# Patient Record
Sex: MALE | Race: ASIAN | NOT HISPANIC OR LATINO
[De-identification: names, ages, dates, MRNs, and addresses within clinical notes are randomized per-mention and may not be internally consistent; named-entity substitution may affect disease eponyms.]

---

## 2018-12-05 ENCOUNTER — APPOINTMENT (OUTPATIENT)
Dept: FAMILY MEDICINE | Facility: CLINIC | Age: 78
End: 2018-12-05
Payer: COMMERCIAL

## 2018-12-05 VITALS
TEMPERATURE: 97.9 F | WEIGHT: 125 LBS | HEART RATE: 77 BPM | HEIGHT: 64 IN | DIASTOLIC BLOOD PRESSURE: 80 MMHG | SYSTOLIC BLOOD PRESSURE: 128 MMHG | BODY MASS INDEX: 21.34 KG/M2

## 2018-12-05 DIAGNOSIS — Z87.19 PERSONAL HISTORY OF OTHER DISEASES OF THE DIGESTIVE SYSTEM: ICD-10-CM

## 2018-12-05 DIAGNOSIS — Z78.9 OTHER SPECIFIED HEALTH STATUS: ICD-10-CM

## 2018-12-05 PROCEDURE — G0009: CPT

## 2018-12-05 PROCEDURE — 36415 COLL VENOUS BLD VENIPUNCTURE: CPT

## 2018-12-05 PROCEDURE — 90732 PPSV23 VACC 2 YRS+ SUBQ/IM: CPT

## 2018-12-05 PROCEDURE — 99397 PER PM REEVAL EST PAT 65+ YR: CPT | Mod: 25

## 2018-12-05 NOTE — PLAN
[FreeTextEntry1] : Patient advised to schedule eye exam and to check into Shingrix vaccination with his insurance. He should f/u in 2 weeks for TdaP.

## 2018-12-05 NOTE — HEALTH RISK ASSESSMENT
[Very Good] : ~his/her~  mood as very good [Intercurrent hospitalizations] : was admitted to the hospital  [No falls in past year] : Patient reported no falls in the past year [0] : 2) Feeling down, depressed, or hopeless: Not at all (0) [None] : None [With Significant Other] : lives with significant other [Employed] : employed [] :  [Feels Safe at Home] : Feels safe at home [Fully functional (bathing, dressing, toileting, transferring, walking, feeding)] : Fully functional (bathing, dressing, toileting, transferring, walking, feeding) [Fully functional (using the telephone, shopping, preparing meals, housekeeping, doing laundry, using] : Fully functional and needs no help or supervision to perform IADLs (using the telephone, shopping, preparing meals, housekeeping, doing laundry, using transportation, managing medications and managing finances) [Reports changes in dental health] : Reports changes in dental health [Discussed at today's visit] : Advance Directives Discussed at today's visit [FreeTextEntry1] : none [] : No [[id number]] : 0 [Change in mental status noted] : No change in mental status noted [Reports changes in hearing] : Reports no changes in hearing [Reports changes in vision] : Reports no changes in vision [ColonoscopyComments] : never [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

## 2018-12-05 NOTE — HISTORY OF PRESENT ILLNESS
[FreeTextEntry1] : Physical [de-identified] : 77 y/o male presents for a routine physical examination. Feeling well.

## 2018-12-06 LAB
25(OH)D3 SERPL-MCNC: 29.6 NG/ML
ALBUMIN SERPL ELPH-MCNC: 4.1 G/DL
ALP BLD-CCNC: 85 U/L
ALT SERPL-CCNC: 32 U/L
ANION GAP SERPL CALC-SCNC: 12 MMOL/L
APPEARANCE: CLEAR
AST SERPL-CCNC: 37 U/L
BACTERIA: NEGATIVE
BASOPHILS # BLD AUTO: 0.04 K/UL
BASOPHILS NFR BLD AUTO: 0.6 %
BILIRUB SERPL-MCNC: 0.4 MG/DL
BILIRUBIN URINE: NEGATIVE
BLOOD URINE: NEGATIVE
BUN SERPL-MCNC: 11 MG/DL
CALCIUM SERPL-MCNC: 9.2 MG/DL
CHLORIDE SERPL-SCNC: 104 MMOL/L
CHOLEST SERPL-MCNC: 245 MG/DL
CHOLEST/HDLC SERPL: 4.2 RATIO
CO2 SERPL-SCNC: 27 MMOL/L
COLOR: YELLOW
CREAT SERPL-MCNC: 0.76 MG/DL
EOSINOPHIL # BLD AUTO: 0.22 K/UL
EOSINOPHIL NFR BLD AUTO: 3.3 %
GLUCOSE QUALITATIVE U: NEGATIVE MG/DL
GLUCOSE SERPL-MCNC: 93 MG/DL
HBA1C MFR BLD HPLC: 5.8 %
HCT VFR BLD CALC: 43 %
HDLC SERPL-MCNC: 59 MG/DL
HGB BLD-MCNC: 13.9 G/DL
IMM GRANULOCYTES NFR BLD AUTO: 0.2 %
KETONES URINE: NEGATIVE
LDLC SERPL CALC-MCNC: 161 MG/DL
LEUKOCYTE ESTERASE URINE: NEGATIVE
LYMPHOCYTES # BLD AUTO: 2.25 K/UL
LYMPHOCYTES NFR BLD AUTO: 33.8 %
MAN DIFF?: NORMAL
MCHC RBC-ENTMCNC: 32.3 GM/DL
MCHC RBC-ENTMCNC: 32.8 PG
MCV RBC AUTO: 101.4 FL
MICROSCOPIC-UA: NORMAL
MONOCYTES # BLD AUTO: 0.28 K/UL
MONOCYTES NFR BLD AUTO: 4.2 %
NEUTROPHILS # BLD AUTO: 3.86 K/UL
NEUTROPHILS NFR BLD AUTO: 57.9 %
NITRITE URINE: NEGATIVE
PH URINE: 8
PLATELET # BLD AUTO: 232 K/UL
POTASSIUM SERPL-SCNC: 4.4 MMOL/L
PROT SERPL-MCNC: 6.8 G/DL
PROTEIN URINE: NEGATIVE MG/DL
RBC # BLD: 4.24 M/UL
RBC # FLD: 13 %
RED BLOOD CELLS URINE: 0 /HPF
SODIUM SERPL-SCNC: 143 MMOL/L
SPECIFIC GRAVITY URINE: 1.01
SQUAMOUS EPITHELIAL CELLS: 0 /HPF
TRIGL SERPL-MCNC: 124 MG/DL
UROBILINOGEN URINE: NEGATIVE MG/DL
WBC # FLD AUTO: 6.66 K/UL
WHITE BLOOD CELLS URINE: 0 /HPF

## 2018-12-31 ENCOUNTER — APPOINTMENT (OUTPATIENT)
Dept: FAMILY MEDICINE | Facility: CLINIC | Age: 78
End: 2018-12-31

## 2019-01-03 ENCOUNTER — RECORD ABSTRACTING (OUTPATIENT)
Age: 79
End: 2019-01-03

## 2019-01-07 ENCOUNTER — APPOINTMENT (OUTPATIENT)
Dept: FAMILY MEDICINE | Facility: CLINIC | Age: 79
End: 2019-01-07
Payer: COMMERCIAL

## 2019-01-07 ENCOUNTER — RX RENEWAL (OUTPATIENT)
Age: 79
End: 2019-01-07

## 2019-01-07 VITALS
BODY MASS INDEX: 21.34 KG/M2 | SYSTOLIC BLOOD PRESSURE: 122 MMHG | WEIGHT: 125 LBS | TEMPERATURE: 98.1 F | HEART RATE: 80 BPM | DIASTOLIC BLOOD PRESSURE: 72 MMHG | HEIGHT: 64 IN

## 2019-01-07 VITALS — SYSTOLIC BLOOD PRESSURE: 102 MMHG | DIASTOLIC BLOOD PRESSURE: 56 MMHG

## 2019-01-07 DIAGNOSIS — R60.9 EDEMA, UNSPECIFIED: ICD-10-CM

## 2019-01-07 PROCEDURE — 99213 OFFICE O/P EST LOW 20 MIN: CPT

## 2019-01-07 NOTE — PHYSICAL EXAM
[No Acute Distress] : no acute distress [Well Nourished] : well nourished [Clear to Auscultation] : lungs were clear to auscultation bilaterally [Normal Rate] : normal rate  [Regular Rhythm] : with a regular rhythm [Normal S1, S2] : normal S1 and S2 [Normal Affect] : the affect was normal [Normal Mood] : the mood was normal [de-identified] : Pitting edema B/L lower extremities to the knee

## 2019-01-07 NOTE — HISTORY OF PRESENT ILLNESS
[FreeTextEntry1] : Follow-up [de-identified] : 77 y/o male with hx of TIA. Developed significant lower leg edema from amlodipine.

## 2019-01-07 NOTE — REVIEW OF SYSTEMS
[Chest Pain] : no chest pain [Palpitations] : no palpitations [Lower Ext Edema] : lower extremity edema [Headache] : no headache [Negative] : Psychiatric

## 2019-04-03 ENCOUNTER — RX RENEWAL (OUTPATIENT)
Age: 79
End: 2019-04-03

## 2021-02-09 ENCOUNTER — RESULT REVIEW (OUTPATIENT)
Age: 81
End: 2021-02-09

## 2021-02-09 ENCOUNTER — APPOINTMENT (OUTPATIENT)
Dept: GERIATRICS | Facility: CLINIC | Age: 81
End: 2021-02-09
Payer: COMMERCIAL

## 2021-02-09 VITALS
BODY MASS INDEX: 21.63 KG/M2 | SYSTOLIC BLOOD PRESSURE: 130 MMHG | WEIGHT: 126 LBS | TEMPERATURE: 98.3 F | OXYGEN SATURATION: 98 % | DIASTOLIC BLOOD PRESSURE: 80 MMHG | HEART RATE: 80 BPM

## 2021-02-09 DIAGNOSIS — M15.9 POLYOSTEOARTHRITIS, UNSPECIFIED: ICD-10-CM

## 2021-02-09 PROCEDURE — 99215 OFFICE O/P EST HI 40 MIN: CPT

## 2021-02-09 RX ORDER — ATORVASTATIN CALCIUM 20 MG/1
20 TABLET, FILM COATED ORAL DAILY
Qty: 180 | Refills: 0 | Status: DISCONTINUED | COMMUNITY
Start: 1900-01-01 | End: 2021-02-09

## 2021-02-09 RX ORDER — AMLODIPINE BESYLATE 10 MG/1
10 TABLET ORAL DAILY
Qty: 90 | Refills: 0 | Status: DISCONTINUED | COMMUNITY
Start: 1900-01-01 | End: 2021-02-09

## 2021-02-09 RX ORDER — LISINOPRIL 5 MG/1
5 TABLET ORAL DAILY
Qty: 30 | Refills: 3 | Status: DISCONTINUED | COMMUNITY
Start: 2019-01-07 | End: 2021-02-09

## 2021-02-09 NOTE — HISTORY OF PRESENT ILLNESS
[0] : 1) Little interest or pleasure doing things: Not at all [FreeTextEntry1] : Healthy man until 2018\par diagnosed with TIA\par started on BP meds, plavix and ?statin\par one year later had first bladder infection\par sees urologist Dr. Primitivo Osman\par has had 2 infections in his life now\par usually responds to antibiotics\par \par for some reason no longer on statin\par also admits to non compliance with meds\par during losartan recall\par switched dose from 50mg daily to 25mg BID for convenience\par often skips 2-3 doses a week\par \par has some numbness on L first toe\par for several years\par was told by chiropractor about 2 years ago that he has back - disc disease\par \par eager to have physical [PHQ-2 Score ___] : PHQ-2 Score [unfilled]

## 2021-02-09 NOTE — PHYSICAL EXAM
[General Appearance - Alert] : alert [General Appearance - In No Acute Distress] : in no acute distress [General Appearance - Well Nourished] : well nourished [General Appearance - Well Developed] : well developed [Sclera] : the sclera and conjunctiva were normal [Extraocular Movements] : extraocular movements were intact [PERRL With Normal Accommodation] : pupils were equal in size, round, and reactive to light [Normal Oral Mucosa] : normal oral mucosa [No Oral Pallor] : no oral pallor [Neck Appearance] : the appearance of the neck was normal [Neck Cervical Mass (___cm)] : no neck mass was observed [Respiration, Rhythm And Depth] : normal respiratory rhythm and effort [Exaggerated Use Of Accessory Muscles For Inspiration] : no accessory muscle use [Auscultation Breath Sounds / Voice Sounds] : lungs were clear to auscultation bilaterally [Heart Rate And Rhythm] : heart rate was normal and rhythm regular [Heart Sounds] : normal S1 and S2 [Heart Sounds Gallop] : no gallops [Heart Sounds Pericardial Friction Rub] : no pericardial rub [Bowel Sounds] : normal bowel sounds [Abdomen Soft] : soft [Abdomen Tenderness] : non-tender [Cervical Lymph Nodes Enlarged Posterior Bilaterally] : posterior cervical [Cervical Lymph Nodes Enlarged Anterior Bilaterally] : anterior cervical [No CVA Tenderness] : no ~M costovertebral angle tenderness [No Spinal Tenderness] : no spinal tenderness [Abnormal Walk] : normal gait [Nail Clubbing] : no clubbing  or cyanosis of the fingernails [Musculoskeletal - Swelling] : no joint swelling seen [Motor Tone] : muscle strength and tone were normal [Skin Color & Pigmentation] : normal skin color and pigmentation [] : no rash [Skin Turgor] : normal skin turgor [Skin Lesions] : no skin lesions [Affect] : the affect was normal [Mood] : the mood was normal

## 2021-02-09 NOTE — ASSESSMENT
[FreeTextEntry1] : needs true PCP\par check labs today including A1C B12 and lipids\par unclear why is not on statin after TIA\par will likely resume lipitor 20mg as reported by prior IM physician - Dr Pizarro\par neuropathy to warrant other labs\par \par advised to take losartan 50 mg or 2 25mg tabs in AM with plavix\par hold HCTZ and also use PRN leg edema\par (edema was likely due to norvac that has since stopped)\par keep BP log BID and RTC one month for annual exam\par needs EKG and prosatte exam\par labs today will also include PSA\par \par in future to discuss colonoscopy exams in past - if not done consider cologuard\par also to discuss vaccines etc\par \par obtain records from prior PCP and urologist\par release signed today

## 2021-02-09 NOTE — SOCIAL HISTORY
[No falls in past year] : Patient reported no falls in the past year [Fully functional (bathing, dressing, toileting, transferring, walking, feeding)] : Fully functional (bathing, dressing, toileting, transferring, walking, feeding) [Fully functional (using the telephone, shopping, preparing meals, housekeeping, doing laundry, using transportation,] : Fully functional and needs no help or supervision to perform IADLs (using the telephone, shopping, preparing meals, housekeeping, doing laundry, using transportation, managing medications and managing finances) [Smoke Detector] : smoke detector [Carbon Monoxide Detector] : carbon monoxide detector [Seat Belt] :  uses seat belt

## 2021-02-09 NOTE — REVIEW OF SYSTEMS
[Fever] : no fever [Chills] : no chills [Feeling Poorly] : not feeling poorly [Feeling Tired] : not feeling tired [Eye Pain] : no eye pain [Red Eyes] : eyes not red [Nosebleeds] : no nosebleeds [Nasal Discharge] : no nasal discharge [Chest Pain] : no chest pain [Palpitations] : no palpitations [Cough] : no cough [SOB on Exertion] : no shortness of breath during exertion [Constipation] : no constipation [Diarrhea] : no diarrhea [Dysuria] : no dysuria [Incontinence] : no incontinence [Arthralgias] : arthralgias [Skin Lesions] : no skin lesions [Skin Wound] : no skin wound [Dizziness] : no dizziness [Fainting] : no fainting [Anxiety] : no anxiety [Depression] : no depression

## 2021-03-30 ENCOUNTER — APPOINTMENT (OUTPATIENT)
Dept: GERIATRICS | Facility: CLINIC | Age: 81
End: 2021-03-30
Payer: COMMERCIAL

## 2021-03-30 ENCOUNTER — NON-APPOINTMENT (OUTPATIENT)
Age: 81
End: 2021-03-30

## 2021-03-30 VITALS
WEIGHT: 126 LBS | SYSTOLIC BLOOD PRESSURE: 158 MMHG | TEMPERATURE: 98 F | DIASTOLIC BLOOD PRESSURE: 80 MMHG | OXYGEN SATURATION: 97 % | BODY MASS INDEX: 21.63 KG/M2 | HEART RATE: 76 BPM

## 2021-03-30 DIAGNOSIS — G62.9 POLYNEUROPATHY, UNSPECIFIED: ICD-10-CM

## 2021-03-30 DIAGNOSIS — Z00.00 ENCOUNTER FOR GENERAL ADULT MEDICAL EXAMINATION W/OUT ABNORMAL FINDINGS: ICD-10-CM

## 2021-03-30 PROCEDURE — G0439: CPT

## 2021-03-30 PROCEDURE — 93010 ELECTROCARDIOGRAM REPORT: CPT

## 2021-03-30 NOTE — ASSESSMENT
[FreeTextEntry1] : needs true PCP\par BP remains elevated\par increase losartan to 75mg daily in AM with plavix in AM\par compliance reemphasized\par unclear why is not on statin after TIA\par wiill optimize BP prior to discussing statin - lipids were checked and moderately elevted\par unfortauntely due to TIA will remain on statin life long\par \par will likely resume lipitor 20mg as reported by prior IM physician - Dr Pizarro\par hold HCTZ and also use PRN leg edema\par (edema was likely due to norvac that has since stopped)\par EKG prostate exam and PSa unrevealing\par will bring in bp log on in month to review and finalize dose of losartan\par possbily will increase to 100 mg daily on that day\par \par cologuard ordered as he is anemic - unclear if colon etiology or related to plavix\par \par \par in future to discuss vaccines/MOCA/MOLST\par \par

## 2021-03-30 NOTE — REVIEW OF SYSTEMS
[Fever] : no fever [Chills] : no chills [Night Sweats] : no night sweats [Recent Change In Weight] : ~T no recent weight change [Discharge] : no discharge [Vision Problems] : no vision problems [Earache] : no earache [Nasal Discharge] : no nasal discharge [Palpitations] : no palpitations [Shortness Of Breath] : no shortness of breath [Wheezing] : no wheezing [Cough] : no cough [Abdominal Pain] : no abdominal pain [Vomiting] : no vomiting [Dysuria] : no dysuria [Hematuria] : no hematuria [Joint Pain] : no joint pain [Back Pain] : no back pain [Itching] : no itching [Skin Rash] : no skin rash [Headache] : no headache [Memory Loss] : no memory loss [Easy Bruising] : easy bruising

## 2021-03-30 NOTE — PHYSICAL EXAM
[No Acute Distress] : no acute distress [Well Nourished] : well nourished [Well Developed] : well developed [Normal Sclera/Conjunctiva] : normal sclera/conjunctiva [PERRL] : pupils equal round and reactive to light [EOMI] : extraocular movements intact [Normal Outer Ear/Nose] : the outer ears and nose were normal in appearance [Normal Oropharynx] : the oropharynx was normal [Normal TMs] : both tympanic membranes were normal [No JVD] : no jugular venous distention [No Lymphadenopathy] : no lymphadenopathy [Supple] : supple [No Respiratory Distress] : no respiratory distress  [No Accessory Muscle Use] : no accessory muscle use [Clear to Auscultation] : lungs were clear to auscultation bilaterally [Normal Rate] : normal rate  [Regular Rhythm] : with a regular rhythm [Normal S1, S2] : normal S1 and S2 [Normal Sphincter Tone] : normal sphincter tone [No Mass] : no mass [Prostate Tenderness] : the prostate was not tender [No Prostate Nodules] : no prostate nodules [Normal Supraclavicular Nodes] : no supraclavicular lymphadenopathy [Normal Anterior Cervical Nodes] : no anterior cervical lymphadenopathy [No CVA Tenderness] : no CVA  tenderness [No Spinal Tenderness] : no spinal tenderness [No Joint Swelling] : no joint swelling [Grossly Normal Strength/Tone] : grossly normal strength/tone [No Rash] : no rash [No Skin Lesions] : no skin lesions [No Focal Deficits] : no focal deficits [Normal Gait] : normal gait [Deep Tendon Reflexes (DTR)] : deep tendon reflexes were 2+ and symmetric [Normal Affect] : the affect was normal [Normal Insight/Judgement] : insight and judgment were intact

## 2021-03-30 NOTE — HISTORY OF PRESENT ILLNESS
[Spouse] : spouse [FreeTextEntry1] : annual exam [de-identified] : Healthy man until 2018\par diagnosed with TIA\par started on BP meds, plavix and ?statin\par one year later had first bladder infection\par sees urologist Dr. Primitivo Osman\par has had 2 infections in his life now\par usually responds to antibiotics\par \par for some reason no longer on statin\par also admits to non compliance with meds\par during losartan recall\par switched dose from 50mg daily to 25mg BID for convenience\par often skips 2-3 doses a week\par \par since first visit has been compliant with losartan 50mg QAM and plavix 75mg QAM\par He has brought in a BP log which shows elevated readings\par \par thinks last colonoscopy was 15 years ago, thinks it was normal\par not aware of being anemic in the past, as he is now\par \par denies overt bleeding\par

## 2021-03-30 NOTE — HEALTH RISK ASSESSMENT
[No falls in past year] : Patient reported no falls in the past year [0] : 2) Feeling down, depressed, or hopeless: Not at all (0) [With Significant Other] : lives with significant other [Fully functional (bathing, dressing, toileting, transferring, walking, feeding)] : Fully functional (bathing, dressing, toileting, transferring, walking, feeding) [] :  [Fully functional (using the telephone, shopping, preparing meals, housekeeping, doing laundry, using] : Fully functional and needs no help or supervision to perform IADLs (using the telephone, shopping, preparing meals, housekeeping, doing laundry, using transportation, managing medications and managing finances) [Smoke Detector] : smoke detector [Carbon Monoxide Detector] : carbon monoxide detector [Seat Belt] :  uses seat belt [Fair] : ~his/her~ current health as fair  [Good] : ~his/her~  mood as  good [] : No [[id number]] : 0 [Patient declined colonoscopy] : Patient declined colonoscopy [Retired] : retired

## 2021-04-22 ENCOUNTER — RX RENEWAL (OUTPATIENT)
Age: 81
End: 2021-04-22

## 2021-05-12 ENCOUNTER — APPOINTMENT (OUTPATIENT)
Dept: GERIATRICS | Facility: CLINIC | Age: 81
End: 2021-05-12

## 2021-07-21 ENCOUNTER — RX RENEWAL (OUTPATIENT)
Age: 81
End: 2021-07-21

## 2021-11-12 ENCOUNTER — RX RENEWAL (OUTPATIENT)
Age: 81
End: 2021-11-12

## 2021-11-22 ENCOUNTER — TRANSCRIPTION ENCOUNTER (OUTPATIENT)
Age: 81
End: 2021-11-22

## 2022-03-30 ENCOUNTER — APPOINTMENT (OUTPATIENT)
Dept: GERIATRICS | Facility: CLINIC | Age: 82
End: 2022-03-30
Payer: MEDICARE

## 2022-03-30 ENCOUNTER — LABORATORY RESULT (OUTPATIENT)
Age: 82
End: 2022-03-30

## 2022-03-30 VITALS
HEIGHT: 64 IN | SYSTOLIC BLOOD PRESSURE: 156 MMHG | BODY MASS INDEX: 21 KG/M2 | OXYGEN SATURATION: 99 % | TEMPERATURE: 98 F | HEART RATE: 81 BPM | DIASTOLIC BLOOD PRESSURE: 80 MMHG | WEIGHT: 123 LBS

## 2022-03-30 DIAGNOSIS — N30.20 OTHER CHRONIC CYSTITIS W/OUT HEMATURIA: ICD-10-CM

## 2022-03-30 PROCEDURE — 36415 COLL VENOUS BLD VENIPUNCTURE: CPT

## 2022-03-30 PROCEDURE — 93000 ELECTROCARDIOGRAM COMPLETE: CPT

## 2022-03-30 PROCEDURE — 99214 OFFICE O/P EST MOD 30 MIN: CPT | Mod: 25

## 2022-03-30 RX ORDER — HYDROCHLOROTHIAZIDE 12.5 MG/1
12.5 TABLET ORAL DAILY
Qty: 30 | Refills: 1 | Status: COMPLETED | COMMUNITY
Start: 2020-09-01 | End: 2022-03-30

## 2022-03-30 NOTE — ASSESSMENT
[FreeTextEntry1] : needs true PCP\par BP remains elevated\par increase to losartan 100mg daily\par has been compliant with plavix which is great as opposed to when he started in my practice\par not wanting to be on statin\par but is on red yeast rice\par check labs today\par labs drawn in office \par \par in past was on lipitor 20mg as reported by prior IM physician - Dr Pizarro\par hold HCTZ and also use PRN leg edema\par \par in future to discuss vaccines/MOCA/MOLST\par \par discussed to switch all meds to 6PM x 2 weeks\par to see if fatigue will improve\par then can increase to losartan 100mg and trial until next visit which will be physical

## 2022-03-30 NOTE — REVIEW OF SYSTEMS
[Easy Bruising] : easy bruising [Fever] : no fever [Chills] : no chills [Night Sweats] : no night sweats [Recent Change In Weight] : ~T no recent weight change [Discharge] : no discharge [Vision Problems] : no vision problems [Earache] : no earache [Nasal Discharge] : no nasal discharge [Palpitations] : no palpitations [Shortness Of Breath] : no shortness of breath [Wheezing] : no wheezing [Cough] : no cough [Abdominal Pain] : no abdominal pain [Vomiting] : no vomiting [Dysuria] : no dysuria [Hematuria] : no hematuria [Joint Pain] : no joint pain [Back Pain] : no back pain [Itching] : no itching [Skin Rash] : no skin rash [Headache] : no headache [Memory Loss] : no memory loss

## 2022-03-30 NOTE — HISTORY OF PRESENT ILLNESS
[No falls in past year] : Patient reported no falls in the past year [Completely Independent] : Completely independent. [Smoke Detector] : smoke detector [0] : 2) Feeling down, depressed, or hopeless: Not at all (0) [PHQ-2 Negative - No further assessment needed] : PHQ-2 Negative - No further assessment needed [FreeTextEntry1] : Healthy man until 2018\par diagnosed with TIA\par started on BP meds, plavix and ?statin\par one year later had first bladder infection\par sees urologist Dr. Primitivo Osman\par has had 2 infections in his life now\par usually responds to antibiotics\par \par not wanting to be on statin\par now on red yeast rice \par remains on losartan at this time but log shows poor control especially in PM\par \par \par feels more tired during the day since turning 80 (he is now 81)\par  \par  \par  [[id number]] : 0

## 2022-03-31 LAB
25(OH)D3 SERPL-MCNC: 26.8 NG/ML
ALBUMIN SERPL ELPH-MCNC: 4.3 G/DL
ALP BLD-CCNC: 85 U/L
ALT SERPL-CCNC: 30 U/L
ANION GAP SERPL CALC-SCNC: 16 MMOL/L
APPEARANCE: CLEAR
AST SERPL-CCNC: 28 U/L
BASOPHILS # BLD AUTO: 0.04 K/UL
BASOPHILS NFR BLD AUTO: 0.9 %
BILIRUB SERPL-MCNC: 0.5 MG/DL
BILIRUBIN URINE: NEGATIVE
BLOOD URINE: NEGATIVE
BUN SERPL-MCNC: 13 MG/DL
CALCIUM SERPL-MCNC: 9.6 MG/DL
CHLORIDE SERPL-SCNC: 104 MMOL/L
CHOLEST SERPL-MCNC: 256 MG/DL
CO2 SERPL-SCNC: 20 MMOL/L
COLOR: YELLOW
CREAT SERPL-MCNC: 0.64 MG/DL
EGFR: 95 ML/MIN/1.73M2
EOSINOPHIL # BLD AUTO: 0 K/UL
EOSINOPHIL NFR BLD AUTO: 0 %
ESTIMATED AVERAGE GLUCOSE: 126 MG/DL
GLUCOSE QUALITATIVE U: NEGATIVE
GLUCOSE SERPL-MCNC: 113 MG/DL
HBA1C MFR BLD HPLC: 6 %
HCT VFR BLD CALC: 39.4 %
HDLC SERPL-MCNC: 67 MG/DL
HGB BLD-MCNC: 13.5 G/DL
IRON SATN MFR SERPL: 38 %
IRON SERPL-MCNC: 119 UG/DL
KETONES URINE: NEGATIVE
LDLC SERPL CALC-MCNC: 172 MG/DL
LEUKOCYTE ESTERASE URINE: NEGATIVE
LYMPHOCYTES # BLD AUTO: 1.04 K/UL
LYMPHOCYTES NFR BLD AUTO: 23.3 %
MAN DIFF?: NORMAL
MCHC RBC-ENTMCNC: 33.8 PG
MCHC RBC-ENTMCNC: 34.3 GM/DL
MCV RBC AUTO: 98.7 FL
MONOCYTES # BLD AUTO: 0.12 K/UL
MONOCYTES NFR BLD AUTO: 2.6 %
NEUTROPHILS # BLD AUTO: 3.11 K/UL
NEUTROPHILS NFR BLD AUTO: 69.8 %
NITRITE URINE: NEGATIVE
NONHDLC SERPL-MCNC: 190 MG/DL
PH URINE: 6
PLATELET # BLD AUTO: 211 K/UL
POTASSIUM SERPL-SCNC: 4.2 MMOL/L
PROT SERPL-MCNC: 6.8 G/DL
PROTEIN URINE: NEGATIVE
PSA FREE SERPL-MCNC: 0.24 NG/ML
RBC # BLD: 3.99 M/UL
RBC # FLD: 12 %
SODIUM SERPL-SCNC: 140 MMOL/L
SPECIFIC GRAVITY URINE: 1.01
TIBC SERPL-MCNC: 312 UG/DL
TRIGL SERPL-MCNC: 87 MG/DL
TSH SERPL-ACNC: 3.36 UIU/ML
UIBC SERPL-MCNC: 193 UG/DL
UROBILINOGEN URINE: NORMAL
VIT B12 SERPL-MCNC: 979 PG/ML
WBC # FLD AUTO: 4.45 K/UL

## 2022-05-19 ENCOUNTER — NON-APPOINTMENT (OUTPATIENT)
Age: 82
End: 2022-05-19

## 2022-05-19 ENCOUNTER — APPOINTMENT (OUTPATIENT)
Dept: GERIATRICS | Facility: CLINIC | Age: 82
End: 2022-05-19
Payer: MEDICARE

## 2022-05-19 VITALS
DIASTOLIC BLOOD PRESSURE: 80 MMHG | HEART RATE: 79 BPM | TEMPERATURE: 97.9 F | OXYGEN SATURATION: 99 % | WEIGHT: 125 LBS | BODY MASS INDEX: 21.46 KG/M2 | SYSTOLIC BLOOD PRESSURE: 144 MMHG

## 2022-05-19 DIAGNOSIS — Z23 ENCOUNTER FOR IMMUNIZATION: ICD-10-CM

## 2022-05-19 PROCEDURE — 93000 ELECTROCARDIOGRAM COMPLETE: CPT | Mod: 59

## 2022-05-19 PROCEDURE — 90670 PCV13 VACCINE IM: CPT

## 2022-05-19 PROCEDURE — 99397 PER PM REEVAL EST PAT 65+ YR: CPT | Mod: 25

## 2022-05-19 PROCEDURE — G0009: CPT | Mod: 59

## 2022-05-19 PROCEDURE — 36415 COLL VENOUS BLD VENIPUNCTURE: CPT

## 2022-05-19 NOTE — ASSESSMENT
[FreeTextEntry1] : now establishing care\par BP better controlled on higher dose of losartan\par now due for lipids\par likely will replace RYR with statin given hx CVA/TIA\par due for pneumonia shot - final today\par has had 3 covid shots but cannot recall date of first booster\par \par \par hold HCTZ and also use PRN leg edema\par EKG PSA unrevealing\par \par MOCA and MOLST in future \par \par

## 2022-05-19 NOTE — HEALTH RISK ASSESSMENT
[No falls in past year] : Patient reported no falls in the past year [0] : 2) Feeling down, depressed, or hopeless: Not at all (0) [] :  [Fully functional (bathing, dressing, toileting, transferring, walking, feeding)] : Fully functional (bathing, dressing, toileting, transferring, walking, feeding) [Fully functional (using the telephone, shopping, preparing meals, housekeeping, doing laundry, using] : Fully functional and needs no help or supervision to perform IADLs (using the telephone, shopping, preparing meals, housekeeping, doing laundry, using transportation, managing medications and managing finances) [Smoke Detector] : smoke detector [Carbon Monoxide Detector] : carbon monoxide detector [[id number]] : 0

## 2022-05-19 NOTE — PHYSICAL EXAM
[No Acute Distress] : no acute distress [Well Nourished] : well nourished [Well Developed] : well developed [Normal Sclera/Conjunctiva] : normal sclera/conjunctiva [PERRL] : pupils equal round and reactive to light [EOMI] : extraocular movements intact [Normal Outer Ear/Nose] : the outer ears and nose were normal in appearance [Normal Oropharynx] : the oropharynx was normal [Normal TMs] : both tympanic membranes were normal [No JVD] : no jugular venous distention [No Lymphadenopathy] : no lymphadenopathy [Supple] : supple [No Respiratory Distress] : no respiratory distress  [No Accessory Muscle Use] : no accessory muscle use [Clear to Auscultation] : lungs were clear to auscultation bilaterally [Normal Rate] : normal rate  [Regular Rhythm] : with a regular rhythm [Normal S1, S2] : normal S1 and S2 [Normal Sphincter Tone] : normal sphincter tone [No Mass] : no mass [Normal Supraclavicular Nodes] : no supraclavicular lymphadenopathy [Normal Anterior Cervical Nodes] : no anterior cervical lymphadenopathy [No CVA Tenderness] : no CVA  tenderness [No Spinal Tenderness] : no spinal tenderness [No Joint Swelling] : no joint swelling [Grossly Normal Strength/Tone] : grossly normal strength/tone [No Rash] : no rash [No Skin Lesions] : no skin lesions [No Focal Deficits] : no focal deficits [Normal Gait] : normal gait [Deep Tendon Reflexes (DTR)] : deep tendon reflexes were 2+ and symmetric [Normal Affect] : the affect was normal [Normal Insight/Judgement] : insight and judgment were intact

## 2022-05-19 NOTE — HISTORY OF PRESENT ILLNESS
[FreeTextEntry1] : annual physical  [de-identified] : presenting for annual physical\par brought in BP log\par BP much better controlled on higher dose of losartan\par continues on red yeast rice BID\par also walking daily\par trying to encourage his wife to walk more\par still feeling fatigued\par no particular improved taking meds as PM meds

## 2022-05-25 LAB
ALBUMIN SERPL ELPH-MCNC: 4.4 G/DL
ALP BLD-CCNC: 82 U/L
ALT SERPL-CCNC: 31 U/L
ANION GAP SERPL CALC-SCNC: 13 MMOL/L
AST SERPL-CCNC: 29 U/L
BASOPHILS # BLD AUTO: 0.02 K/UL
BASOPHILS NFR BLD AUTO: 0.5 %
BILIRUB SERPL-MCNC: 0.4 MG/DL
BUN SERPL-MCNC: 12 MG/DL
CALCIUM SERPL-MCNC: 9.5 MG/DL
CHLORIDE SERPL-SCNC: 101 MMOL/L
CHOLEST SERPL-MCNC: 208 MG/DL
CO2 SERPL-SCNC: 25 MMOL/L
CREAT SERPL-MCNC: 0.69 MG/DL
EGFR: 93 ML/MIN/1.73M2
EOSINOPHIL # BLD AUTO: 0.08 K/UL
EOSINOPHIL NFR BLD AUTO: 1.8 %
ESTIMATED AVERAGE GLUCOSE: 123 MG/DL
GLUCOSE SERPL-MCNC: 103 MG/DL
HBA1C MFR BLD HPLC: 5.9 %
HCT VFR BLD CALC: 41.3 %
HDLC SERPL-MCNC: 62 MG/DL
HGB BLD-MCNC: 13.5 G/DL
IMM GRANULOCYTES NFR BLD AUTO: 0.2 %
LDLC SERPL CALC-MCNC: 125 MG/DL
LYMPHOCYTES # BLD AUTO: 1.59 K/UL
LYMPHOCYTES NFR BLD AUTO: 36.7 %
MAN DIFF?: NORMAL
MCHC RBC-ENTMCNC: 32.7 GM/DL
MCHC RBC-ENTMCNC: 32.9 PG
MCV RBC AUTO: 100.7 FL
MONOCYTES # BLD AUTO: 0.26 K/UL
MONOCYTES NFR BLD AUTO: 6 %
NEUTROPHILS # BLD AUTO: 2.37 K/UL
NEUTROPHILS NFR BLD AUTO: 54.8 %
NONHDLC SERPL-MCNC: 146 MG/DL
PLATELET # BLD AUTO: 193 K/UL
POTASSIUM SERPL-SCNC: 4.1 MMOL/L
PROT SERPL-MCNC: 7 G/DL
RBC # BLD: 4.1 M/UL
RBC # FLD: 12 %
SODIUM SERPL-SCNC: 139 MMOL/L
TRIGL SERPL-MCNC: 107 MG/DL
VIT B12 SERPL-MCNC: 1041 PG/ML
WBC # FLD AUTO: 4.33 K/UL

## 2022-11-02 ENCOUNTER — APPOINTMENT (OUTPATIENT)
Dept: GERIATRICS | Facility: CLINIC | Age: 82
End: 2022-11-02

## 2022-11-02 VITALS
OXYGEN SATURATION: 98 % | BODY MASS INDEX: 21.34 KG/M2 | TEMPERATURE: 97.1 F | HEIGHT: 64 IN | DIASTOLIC BLOOD PRESSURE: 90 MMHG | SYSTOLIC BLOOD PRESSURE: 146 MMHG | WEIGHT: 125 LBS | HEART RATE: 86 BPM

## 2022-11-02 PROCEDURE — 90662 IIV NO PRSV INCREASED AG IM: CPT

## 2022-11-02 PROCEDURE — 99215 OFFICE O/P EST HI 40 MIN: CPT | Mod: 25

## 2022-11-02 PROCEDURE — G0008: CPT

## 2022-11-02 PROCEDURE — 36415 COLL VENOUS BLD VENIPUNCTURE: CPT

## 2022-11-02 NOTE — HISTORY OF PRESENT ILLNESS
[No falls in past year] : Patient reported no falls in the past year [Completely Independent] : Completely independent. [0] : 2) Feeling down, depressed, or hopeless: Not at all (0) [PHQ-2 Negative - No further assessment needed] : PHQ-2 Negative - No further assessment needed [FreeTextEntry1] : Healthy man until 2018\par diagnosed with TIA\par started on BP meds, plavix and ?statin\par one year later had first bladder infection\par sees urologist Dr. Primitivo Osman\par has had 2 infections in his life now\par usually responds to antibiotics\par \par not wanting to be on statin\par now on red yeast rice \par remains on losartan at this time but log shows poor control especially in PM\par \par BP spiking in evening more recently due for labs\par wants flu shot today\par  \par  [[id number]] : 0

## 2022-11-02 NOTE — ASSESSMENT
[FreeTextEntry1] : now establishing care\par BP remains poorly controlled especially given hx TIA/CVA\par now due for lipids \par will draw labs in office today\par flu shot today\par will start sertraline\par suspect anxiety as cause for HTN\par \par hold HCTZ and also use PRN leg edema\par \par MOCA and MOLST in future \par \par

## 2022-11-08 LAB
ALBUMIN SERPL ELPH-MCNC: 4.2 G/DL
ALP BLD-CCNC: 87 U/L
ALT SERPL-CCNC: 23 U/L
ANION GAP SERPL CALC-SCNC: 9 MMOL/L
AST SERPL-CCNC: 26 U/L
BASOPHILS # BLD AUTO: 0.03 K/UL
BASOPHILS NFR BLD AUTO: 0.7 %
BILIRUB SERPL-MCNC: 0.4 MG/DL
BUN SERPL-MCNC: 14 MG/DL
CALCIUM SERPL-MCNC: 9.9 MG/DL
CHLORIDE SERPL-SCNC: 100 MMOL/L
CHOLEST SERPL-MCNC: 243 MG/DL
CO2 SERPL-SCNC: 27 MMOL/L
CREAT SERPL-MCNC: 0.66 MG/DL
EGFR: 94 ML/MIN/1.73M2
EOSINOPHIL # BLD AUTO: 0.17 K/UL
EOSINOPHIL NFR BLD AUTO: 3.9 %
ESTIMATED AVERAGE GLUCOSE: 128 MG/DL
GLUCOSE SERPL-MCNC: 102 MG/DL
HBA1C MFR BLD HPLC: 6.1 %
HCT VFR BLD CALC: 41 %
HDLC SERPL-MCNC: 65 MG/DL
HGB BLD-MCNC: 13.4 G/DL
IMM GRANULOCYTES NFR BLD AUTO: 0 %
LDLC SERPL CALC-MCNC: 160 MG/DL
LYMPHOCYTES # BLD AUTO: 1.56 K/UL
LYMPHOCYTES NFR BLD AUTO: 35.4 %
MAN DIFF?: NORMAL
MCHC RBC-ENTMCNC: 32.4 PG
MCHC RBC-ENTMCNC: 32.7 GM/DL
MCV RBC AUTO: 99.3 FL
MONOCYTES # BLD AUTO: 0.28 K/UL
MONOCYTES NFR BLD AUTO: 6.3 %
NEUTROPHILS # BLD AUTO: 2.37 K/UL
NEUTROPHILS NFR BLD AUTO: 53.7 %
NONHDLC SERPL-MCNC: 177 MG/DL
PLATELET # BLD AUTO: 221 K/UL
POTASSIUM SERPL-SCNC: 4.6 MMOL/L
PROT SERPL-MCNC: 6.8 G/DL
RBC # BLD: 4.13 M/UL
RBC # FLD: 12 %
SODIUM SERPL-SCNC: 136 MMOL/L
TRIGL SERPL-MCNC: 87 MG/DL
TSH SERPL-ACNC: 3.48 UIU/ML
WBC # FLD AUTO: 4.41 K/UL

## 2022-11-21 ENCOUNTER — RX RENEWAL (OUTPATIENT)
Age: 82
End: 2022-11-21

## 2023-03-08 ENCOUNTER — APPOINTMENT (OUTPATIENT)
Dept: GERIATRICS | Facility: CLINIC | Age: 83
End: 2023-03-08
Payer: MEDICARE

## 2023-03-08 VITALS
HEIGHT: 64 IN | SYSTOLIC BLOOD PRESSURE: 142 MMHG | DIASTOLIC BLOOD PRESSURE: 84 MMHG | OXYGEN SATURATION: 99 % | BODY MASS INDEX: 21 KG/M2 | HEART RATE: 86 BPM | TEMPERATURE: 96 F | WEIGHT: 123 LBS

## 2023-03-08 PROCEDURE — 99214 OFFICE O/P EST MOD 30 MIN: CPT | Mod: 25

## 2023-03-08 PROCEDURE — 36415 COLL VENOUS BLD VENIPUNCTURE: CPT

## 2023-03-08 RX ORDER — SERTRALINE 25 MG/1
25 TABLET, FILM COATED ORAL DAILY
Qty: 90 | Refills: 3 | Status: COMPLETED | COMMUNITY
Start: 2022-11-02 | End: 2023-03-08

## 2023-03-08 NOTE — ASSESSMENT
[FreeTextEntry1] : never took sertraline\par BP remains elevated\par but cholesterol was also markedly elevated\par mother  in mid 90's also had HLD\par will check lipids today and consider restarting lipitor 20 which was advised after hospitalizaiton\par \par check labs today\par labs drawn in office \par \par hold HCTZ and also use PRN leg edema\par \par in future to discuss vaccines/MOCA/MOLST\par \par

## 2023-03-08 NOTE — HISTORY OF PRESENT ILLNESS
[No falls in past year] : Patient reported no falls in the past year [Completely Independent] : Completely independent. [Smoke Detector] : smoke detector [0] : 2) Feeling down, depressed, or hopeless: Not at all (0) [PHQ-2 Negative - No further assessment needed] : PHQ-2 Negative - No further assessment needed [FreeTextEntry1] : Healthy man until 2018\par diagnosed with TIA\par started on BP meds, plavix and statin\par one year later had first bladder infection\par sees urologist Dr. Primitivo Osman\par has had 2 infections in his life now\par usually responds to antibiotics\par \par not wanting to be on statin\par now on red yeast rice 2 tabs BID\par reviewed hospital records had been discharged on plavix, lipitor 20 and norvasc 10\par \par remains on losartan at this time but log shows poor control especially in PM\par \par \par admits to fatigue\par  \par  [[id number]] : 0

## 2023-03-14 LAB
BASOPHILS # BLD AUTO: 0.04 K/UL
BASOPHILS NFR BLD AUTO: 0.9 %
CHOLEST SERPL-MCNC: 240 MG/DL
EOSINOPHIL # BLD AUTO: 0.1 K/UL
EOSINOPHIL NFR BLD AUTO: 2.4 %
ESTIMATED AVERAGE GLUCOSE: 120 MG/DL
HBA1C MFR BLD HPLC: 5.8 %
HCT VFR BLD CALC: 39.4 %
HDLC SERPL-MCNC: 70 MG/DL
HGB BLD-MCNC: 13.3 G/DL
IMM GRANULOCYTES NFR BLD AUTO: 0 %
LDLC SERPL CALC-MCNC: 153 MG/DL
LYMPHOCYTES # BLD AUTO: 1.45 K/UL
LYMPHOCYTES NFR BLD AUTO: 34.2 %
MAN DIFF?: NORMAL
MCHC RBC-ENTMCNC: 33.8 GM/DL
MCHC RBC-ENTMCNC: 34.4 PG
MCV RBC AUTO: 101.8 FL
MONOCYTES # BLD AUTO: 0.23 K/UL
MONOCYTES NFR BLD AUTO: 5.4 %
NEUTROPHILS # BLD AUTO: 2.42 K/UL
NEUTROPHILS NFR BLD AUTO: 57.1 %
NONHDLC SERPL-MCNC: 170 MG/DL
PLATELET # BLD AUTO: 184 K/UL
RBC # BLD: 3.87 M/UL
RBC # FLD: 12.5 %
TRIGL SERPL-MCNC: 84 MG/DL
WBC # FLD AUTO: 4.24 K/UL

## 2023-03-27 ENCOUNTER — RX RENEWAL (OUTPATIENT)
Age: 83
End: 2023-03-27

## 2023-07-05 ENCOUNTER — APPOINTMENT (OUTPATIENT)
Dept: GERIATRICS | Facility: CLINIC | Age: 83
End: 2023-07-05
Payer: MEDICARE

## 2023-07-05 VITALS
SYSTOLIC BLOOD PRESSURE: 110 MMHG | WEIGHT: 125 LBS | BODY MASS INDEX: 21.34 KG/M2 | OXYGEN SATURATION: 98 % | DIASTOLIC BLOOD PRESSURE: 60 MMHG | HEART RATE: 73 BPM | HEIGHT: 64 IN

## 2023-07-05 DIAGNOSIS — Z71.89 OTHER SPECIFIED COUNSELING: ICD-10-CM

## 2023-07-05 PROCEDURE — 99497 ADVNCD CARE PLAN 30 MIN: CPT | Mod: 25

## 2023-07-05 PROCEDURE — 99215 OFFICE O/P EST HI 40 MIN: CPT | Mod: 25

## 2023-07-05 NOTE — ASSESSMENT
[FreeTextEntry1] : due to check lipids today now that he is on statin\par labs drawn in office\par next visit annual\par MOLST completed\par they will finalize HCP and bring next visit\par \par MOCA in future \par \par

## 2023-07-05 NOTE — HISTORY OF PRESENT ILLNESS
[Two or more falls in past year] : Patient reported two or more falls in the past year [Completely Independent] : Completely independent. [Smoke Detector] : smoke detector [Carbon Monoxide Detector] : carbon monoxide detector [Shower Chair] : shower chair [Night Light] : night light [Driving Concerns] : driving concerns noted [Wears Seat Belt] : wears seat belt [0] : 2) Feeling down, depressed, or hopeless: Not at all (0) [FreeTextEntry1] : Healthy man until 2018\par diagnosed with TIA\par started on BP meds, plavix and statin\par one year later had first bladder infection\par sees urologist Dr. Primitivo Osman\par has had 2 infections in his life now\par usually responds to antibiotics\par \par not wanting to be on statin\par now on red yeast rice 2 tabs BID\par reviewed hospital records had been discharged on plavix, lipitor 20 and norvasc 10\par \par update:\par here to discuss MOLSt and HCP\par 4 children \par but 2 daughters in [place] are closest\par night time BPs are still elevated\par now on statin\par not walking regularly \par \par  [Guns at Home] : no guns at home [Stair Lift] : no stair lift used in home [Grab Bars] : no grab bars [Anti-Slip Measures] : no anti-slip measures [de-identified] : n/a [PHQ-2 Negative - No further assessment needed] : PHQ-2 Negative - No further assessment needed [[id number]] : 0 [With Patient/Caregiver] : , with patient/caregiver [Reviewed updated] : Reviewed, updated [Designated Healthcare Proxy] : Designated healthcare proxy [Aggressive treatment] : aggressive treatment [Last Verification Date: ___] : Last Verification Date: [unfilled] [Time Spent: ___ minutes] : Time Spent: [unfilled] minutes [AdvancecareDate] : 07/5/23

## 2023-07-06 LAB
CHOLEST SERPL-MCNC: 190 MG/DL
HDLC SERPL-MCNC: 74 MG/DL
LDLC SERPL CALC-MCNC: 100 MG/DL
NONHDLC SERPL-MCNC: 116 MG/DL
TRIGL SERPL-MCNC: 79 MG/DL

## 2023-11-08 ENCOUNTER — APPOINTMENT (OUTPATIENT)
Dept: GERIATRICS | Facility: CLINIC | Age: 83
End: 2023-11-08
Payer: MEDICARE

## 2023-11-08 VITALS
DIASTOLIC BLOOD PRESSURE: 64 MMHG | BODY MASS INDEX: 21.87 KG/M2 | OXYGEN SATURATION: 97 % | TEMPERATURE: 96 F | HEIGHT: 63.39 IN | WEIGHT: 125 LBS | SYSTOLIC BLOOD PRESSURE: 122 MMHG | HEART RATE: 80 BPM

## 2023-11-08 DIAGNOSIS — Z23 ENCOUNTER FOR IMMUNIZATION: ICD-10-CM

## 2023-11-08 DIAGNOSIS — R73.01 IMPAIRED FASTING GLUCOSE: ICD-10-CM

## 2023-11-08 DIAGNOSIS — Z12.5 ENCOUNTER FOR SCREENING FOR MALIGNANT NEOPLASM OF PROSTATE: ICD-10-CM

## 2023-11-08 PROCEDURE — 36415 COLL VENOUS BLD VENIPUNCTURE: CPT

## 2023-11-08 PROCEDURE — G0439: CPT

## 2023-11-08 RX ORDER — AMLODIPINE BESYLATE 2.5 MG/1
2.5 TABLET ORAL DAILY
Qty: 90 | Refills: 3 | Status: ACTIVE | COMMUNITY
Start: 2023-11-08 | End: 1900-01-01

## 2023-11-08 RX ORDER — MELATONIN 10 MG
600 CAPSULE ORAL TWICE DAILY
Refills: 0 | Status: COMPLETED | COMMUNITY
Start: 2022-03-30 | End: 2023-11-08

## 2023-11-22 ENCOUNTER — RX RENEWAL (OUTPATIENT)
Age: 83
End: 2023-11-22

## 2023-11-22 RX ORDER — CLOPIDOGREL BISULFATE 75 MG/1
75 TABLET, FILM COATED ORAL DAILY
Qty: 90 | Refills: 3 | Status: ACTIVE | COMMUNITY
Start: 2021-11-12 | End: 1900-01-01

## 2023-12-11 LAB
ALBUMIN SERPL ELPH-MCNC: 4.2 G/DL
ALP BLD-CCNC: 89 U/L
ALT SERPL-CCNC: 25 U/L
ANION GAP SERPL CALC-SCNC: 12 MMOL/L
APPEARANCE: CLEAR
AST SERPL-CCNC: 28 U/L
BASOPHILS # BLD AUTO: 0.05 K/UL
BASOPHILS NFR BLD AUTO: 1 %
BILIRUB SERPL-MCNC: 0.6 MG/DL
BILIRUBIN URINE: NEGATIVE
BLOOD URINE: NEGATIVE
BUN SERPL-MCNC: 14 MG/DL
CALCIUM SERPL-MCNC: 9.6 MG/DL
CHLORIDE SERPL-SCNC: 102 MMOL/L
CHOLEST SERPL-MCNC: 184 MG/DL
CO2 SERPL-SCNC: 25 MMOL/L
COLOR: YELLOW
CREAT SERPL-MCNC: 0.73 MG/DL
EGFR: 90 ML/MIN/1.73M2
EOSINOPHIL # BLD AUTO: 0.07 K/UL
EOSINOPHIL NFR BLD AUTO: 1.4 %
GLUCOSE QUALITATIVE U: NEGATIVE MG/DL
GLUCOSE SERPL-MCNC: 97 MG/DL
HCT VFR BLD CALC: 41.1 %
HDLC SERPL-MCNC: 77 MG/DL
HGB BLD-MCNC: 13.2 G/DL
IMM GRANULOCYTES NFR BLD AUTO: 0 %
KETONES URINE: NEGATIVE MG/DL
LDLC SERPL CALC-MCNC: 96 MG/DL
LEUKOCYTE ESTERASE URINE: NEGATIVE
LYMPHOCYTES # BLD AUTO: 1.61 K/UL
LYMPHOCYTES NFR BLD AUTO: 32.5 %
MAN DIFF?: NORMAL
MCHC RBC-ENTMCNC: 32.1 GM/DL
MCHC RBC-ENTMCNC: 33 PG
MCV RBC AUTO: 102.8 FL
MONOCYTES # BLD AUTO: 0.34 K/UL
MONOCYTES NFR BLD AUTO: 6.9 %
NEUTROPHILS # BLD AUTO: 2.89 K/UL
NEUTROPHILS NFR BLD AUTO: 58.2 %
NITRITE URINE: NEGATIVE
NONHDLC SERPL-MCNC: 106 MG/DL
PH URINE: 7
PLATELET # BLD AUTO: 194 K/UL
POTASSIUM SERPL-SCNC: 4.7 MMOL/L
PROT SERPL-MCNC: 6.9 G/DL
PROTEIN URINE: NEGATIVE MG/DL
PSA SERPL-MCNC: 0.64 NG/ML
RBC # BLD: 4 M/UL
RBC # FLD: 12.8 %
SODIUM SERPL-SCNC: 139 MMOL/L
SPECIFIC GRAVITY URINE: 1.01
TRIGL SERPL-MCNC: 52 MG/DL
TSH SERPL-ACNC: 3.54 UIU/ML
UROBILINOGEN URINE: 0.2 MG/DL
VIT B12 SERPL-MCNC: 944 PG/ML
WBC # FLD AUTO: 4.96 K/UL

## 2024-01-31 ENCOUNTER — RX RENEWAL (OUTPATIENT)
Age: 84
End: 2024-01-31

## 2024-01-31 RX ORDER — ATORVASTATIN CALCIUM 20 MG/1
20 TABLET, FILM COATED ORAL DAILY
Qty: 90 | Refills: 3 | Status: ACTIVE | COMMUNITY
Start: 2023-03-14 | End: 1900-01-01

## 2024-02-15 ENCOUNTER — APPOINTMENT (OUTPATIENT)
Dept: GERIATRICS | Facility: CLINIC | Age: 84
End: 2024-02-15
Payer: MEDICARE

## 2024-02-15 VITALS
TEMPERATURE: 96.7 F | SYSTOLIC BLOOD PRESSURE: 130 MMHG | OXYGEN SATURATION: 98 % | HEIGHT: 63.75 IN | WEIGHT: 124.38 LBS | HEART RATE: 72 BPM | DIASTOLIC BLOOD PRESSURE: 64 MMHG | BODY MASS INDEX: 21.5 KG/M2

## 2024-02-15 DIAGNOSIS — F41.9 ANXIETY DISORDER, UNSPECIFIED: ICD-10-CM

## 2024-02-15 PROCEDURE — 99214 OFFICE O/P EST MOD 30 MIN: CPT

## 2024-02-15 PROCEDURE — G2211 COMPLEX E/M VISIT ADD ON: CPT

## 2024-02-17 PROBLEM — F41.9 ANXIETY: Status: ACTIVE | Noted: 2022-11-02

## 2024-02-17 NOTE — HISTORY OF PRESENT ILLNESS
[No falls in past year] : Patient reported no falls in the past year [Completely Independent] : Completely independent. [Smoke Detector] : smoke detector [Guns at Home] : no guns at home [Stair Lift] : no stair lift used in home [Shower Chair] : no shower chair [Night Light] : night light [Anti-Slip Measures] : no anti-slip measures [Driving Concerns] : driving concerns noted [Wears Seat Belt] : wears seat belt [Wears Sunscreen] : wears sunscreen [0] : 2) Feeling down, depressed, or hopeless: Not at all (0) [FreeTextEntry1] : Healthy man until 2018 diagnosed with TIA started on BP meds, plavix and statin one year later had first bladder infection sees urologist Dr. Primitivo Osman has had 2 infections in his life now usually responds to antibiotics  not wanting to be on statin now on red yeast rice 2 tabs BID reviewed hospital records had been discharged on plavix, lipitor 20 and norvasc 10  update: increased meds logs are not very improved but repeat check in office is much lower brought in signed HCP and [place] [Two or more falls in past year] : Patient reported two or more falls in the past year [Carbon Monoxide Detector] : carbon monoxide detector [Grab Bars] : no grab bars [de-identified] : n/a [PHQ-2 Negative - No further assessment needed] : PHQ-2 Negative - No further assessment needed [[id number]] : 0 [With Patient/Caregiver] : , with patient/caregiver [Reviewed updated] : Reviewed, updated [Designated Healthcare Proxy] : Designated healthcare proxy [Aggressive treatment] : aggressive treatment [Last Verification Date: ___] : Last Verification Date: [unfilled] [Time Spent: ___ minutes] : Time Spent: [unfilled] minutes [AdvancecareDate] : 07/5/23

## 2024-02-17 NOTE — ASSESSMENT
[FreeTextEntry1] : increased meds logs are not very improved but repeat check in office is much lower will repeat BP log at home and have him repeat 5 mins after I suspect component of white coat HTN but if BP is truly elevated will need to increase meds ZULLY Garcia HCP to be scanned to chart   MOCA in future

## 2024-04-11 RX ORDER — LOSARTAN POTASSIUM 100 MG/1
100 TABLET, FILM COATED ORAL
Qty: 90 | Refills: 3 | Status: ACTIVE | COMMUNITY
Start: 2020-09-01 | End: 1900-01-01

## 2024-05-02 ENCOUNTER — APPOINTMENT (OUTPATIENT)
Dept: GERIATRICS | Facility: CLINIC | Age: 84
End: 2024-05-02
Payer: MEDICARE

## 2024-05-02 VITALS
SYSTOLIC BLOOD PRESSURE: 124 MMHG | OXYGEN SATURATION: 100 % | DIASTOLIC BLOOD PRESSURE: 76 MMHG | TEMPERATURE: 97 F | BODY MASS INDEX: 21.16 KG/M2 | HEART RATE: 71 BPM | WEIGHT: 122.3 LBS

## 2024-05-02 DIAGNOSIS — I63.9 CEREBRAL INFARCTION, UNSPECIFIED: ICD-10-CM

## 2024-05-02 DIAGNOSIS — D64.9 ANEMIA, UNSPECIFIED: ICD-10-CM

## 2024-05-02 DIAGNOSIS — G45.9 TRANSIENT CEREBRAL ISCHEMIC ATTACK, UNSPECIFIED: ICD-10-CM

## 2024-05-02 DIAGNOSIS — E78.49 OTHER HYPERLIPIDEMIA: ICD-10-CM

## 2024-05-02 DIAGNOSIS — I10 ESSENTIAL (PRIMARY) HYPERTENSION: ICD-10-CM

## 2024-05-02 PROCEDURE — 99215 OFFICE O/P EST HI 40 MIN: CPT

## 2024-05-02 PROCEDURE — G2211 COMPLEX E/M VISIT ADD ON: CPT

## 2024-05-02 NOTE — ASSESSMENT
[FreeTextEntry1] : increased meds PM readings remain elevated will switch amlodipine and losartan timing  Tdap in future bring in log next visit

## 2024-05-02 NOTE — PHYSICAL EXAM
[No Acute Distress] : no acute distress [Well Nourished] : well nourished [Well Developed] : well developed [Normal Sclera/Conjunctiva] : normal sclera/conjunctiva [PERRL] : pupils equal round and reactive to light [EOMI] : extraocular movements intact [Normal Outer Ear/Nose] : the outer ears and nose were normal in appearance [Normal Oropharynx] : the oropharynx was normal [Normal TMs] : both tympanic membranes were normal [No JVD] : no jugular venous distention [No Lymphadenopathy] : no lymphadenopathy [Supple] : supple [No Respiratory Distress] : no respiratory distress  [No Accessory Muscle Use] : no accessory muscle use [Clear to Auscultation] : lungs were clear to auscultation bilaterally [Normal Rate] : normal rate  [Regular Rhythm] : with a regular rhythm [Normal S1, S2] : normal S1 and S2 [Normal Sphincter Tone] : normal sphincter tone [No Mass] : no mass [Prostate Tenderness] : the prostate was not tender [No Prostate Nodules] : no prostate nodules [Normal Supraclavicular Nodes] : no supraclavicular lymphadenopathy [Normal Anterior Cervical Nodes] : no anterior cervical lymphadenopathy [No CVA Tenderness] : no CVA  tenderness [No Spinal Tenderness] : no spinal tenderness [No Joint Swelling] : no joint swelling [Grossly Normal Strength/Tone] : grossly normal strength/tone [No Rash] : no rash [No Skin Lesions] : no skin lesions [No Focal Deficits] : no focal deficits [Normal Gait] : normal gait [Normal Affect] : the affect was normal [Deep Tendon Reflexes (DTR)] : deep tendon reflexes were 2+ and symmetric [Normal Insight/Judgement] : insight and judgment were intact

## 2024-05-02 NOTE — HISTORY OF PRESENT ILLNESS
[0] : 2) Feeling down, depressed, or hopeless: Not at all (0) [FreeTextEntry1] : Healthy man until 2018 diagnosed with TIA started on BP meds, plavix and statin one year later had first bladder infection sees urologist Dr. Primitivo Osman has had 2 infections in his life now usually responds to antibiotics  not wanting to be on statin now on red yeast rice 2 tabs BID reviewed hospital records had been discharged on plavix, lipitor 20 and norvasc 10  update: increased meds PM readings are elevated  [Two or more falls in past year] : Patient reported two or more falls in the past year [Completely Independent] : Completely independent. [Smoke Detector] : smoke detector [Carbon Monoxide Detector] : carbon monoxide detector [Grab Bars] : no grab bars [Wears Seat Belt] : wears seat belt [Wears Sunscreen] : wears sunscreen [de-identified] : n/a [PHQ-2 Negative - No further assessment needed] : PHQ-2 Negative - No further assessment needed [[id number]] : 0

## 2024-09-24 ENCOUNTER — APPOINTMENT (OUTPATIENT)
Dept: GERIATRICS | Facility: CLINIC | Age: 84
End: 2024-09-24
Payer: MEDICARE

## 2024-09-24 VITALS
DIASTOLIC BLOOD PRESSURE: 78 MMHG | TEMPERATURE: 97.1 F | HEIGHT: 63.75 IN | WEIGHT: 124 LBS | SYSTOLIC BLOOD PRESSURE: 142 MMHG | OXYGEN SATURATION: 100 % | BODY MASS INDEX: 21.43 KG/M2 | HEART RATE: 75 BPM

## 2024-09-24 DIAGNOSIS — Z12.5 ENCOUNTER FOR SCREENING FOR MALIGNANT NEOPLASM OF PROSTATE: ICD-10-CM

## 2024-09-24 DIAGNOSIS — I10 ESSENTIAL (PRIMARY) HYPERTENSION: ICD-10-CM

## 2024-09-24 DIAGNOSIS — Z23 ENCOUNTER FOR IMMUNIZATION: ICD-10-CM

## 2024-09-24 DIAGNOSIS — G45.9 TRANSIENT CEREBRAL ISCHEMIC ATTACK, UNSPECIFIED: ICD-10-CM

## 2024-09-24 PROCEDURE — 36415 COLL VENOUS BLD VENIPUNCTURE: CPT

## 2024-09-24 PROCEDURE — 90662 IIV NO PRSV INCREASED AG IM: CPT

## 2024-09-24 PROCEDURE — G2211 COMPLEX E/M VISIT ADD ON: CPT

## 2024-09-24 PROCEDURE — G0008: CPT

## 2024-09-24 PROCEDURE — 99214 OFFICE O/P EST MOD 30 MIN: CPT

## 2024-09-24 NOTE — HISTORY OF PRESENT ILLNESS
[Two or more falls in past year] : Patient reported two or more falls in the past year [Completely Independent] : Completely independent. [Smoke Detector] : smoke detector [Carbon Monoxide Detector] : carbon monoxide detector [Wears Seat Belt] : wears seat belt [Wears Sunscreen] : wears sunscreen [NO] : No [0] : 2) Feeling down, depressed, or hopeless: Not at all (0) [PHQ-2 Negative - No further assessment needed] : PHQ-2 Negative - No further assessment needed [FreeTextEntry1] : Healthy man until 2018 diagnosed with TIA started on BP meds, plavix and statin one year later had first bladder infection sees urologist Dr. Primitivo Osman has had 2 infections in his life now usually responds to antibiotics  not wanting to be on statin now on red yeast rice 2 tabs BID reviewed hospital records had been discharged on plavix, lipitor 20 and norvasc 10  update: increased meds also changed timing of drugs - one taken in AM on in PM BP readings are improved some lability bp ranges [id number]/60-86 no more elevated readings about  he feels more in control  [Grab Bars] : no grab bars [de-identified] : n/a [[id number]] : 0

## 2024-09-27 LAB
ALBUMIN SERPL ELPH-MCNC: 4.2 G/DL
ALP BLD-CCNC: 88 U/L
ALT SERPL-CCNC: 34 U/L
ANION GAP SERPL CALC-SCNC: 14 MMOL/L
AST SERPL-CCNC: 33 U/L
BASOPHILS # BLD AUTO: 0.04 K/UL
BASOPHILS NFR BLD AUTO: 0.9 %
BILIRUB SERPL-MCNC: 0.6 MG/DL
BUN SERPL-MCNC: 12 MG/DL
CALCIUM SERPL-MCNC: 9.1 MG/DL
CHLORIDE SERPL-SCNC: 99 MMOL/L
CHOLEST SERPL-MCNC: 167 MG/DL
CO2 SERPL-SCNC: 24 MMOL/L
CREAT SERPL-MCNC: 0.68 MG/DL
EGFR: 92 ML/MIN/1.73M2
EOSINOPHIL # BLD AUTO: 0.04 K/UL
EOSINOPHIL NFR BLD AUTO: 0.9 %
GLUCOSE SERPL-MCNC: 103 MG/DL
HCT VFR BLD CALC: 38.3 %
HDLC SERPL-MCNC: 67 MG/DL
HGB BLD-MCNC: 12.8 G/DL
IMM GRANULOCYTES NFR BLD AUTO: 0 %
LDLC SERPL CALC-MCNC: 85 MG/DL
LYMPHOCYTES # BLD AUTO: 1.59 K/UL
LYMPHOCYTES NFR BLD AUTO: 37.3 %
MAN DIFF?: NORMAL
MCHC RBC-ENTMCNC: 33.2 PG
MCHC RBC-ENTMCNC: 33.4 GM/DL
MCV RBC AUTO: 99.5 FL
MONOCYTES # BLD AUTO: 0.3 K/UL
MONOCYTES NFR BLD AUTO: 7 %
NEUTROPHILS # BLD AUTO: 2.29 K/UL
NEUTROPHILS NFR BLD AUTO: 53.9 %
NONHDLC SERPL-MCNC: 100 MG/DL
PLATELET # BLD AUTO: 191 K/UL
POTASSIUM SERPL-SCNC: 4.2 MMOL/L
PROT SERPL-MCNC: 7 G/DL
PSA SERPL-MCNC: 0.69 NG/ML
RBC # BLD: 3.85 M/UL
RBC # FLD: 12.4 %
SODIUM SERPL-SCNC: 137 MMOL/L
TRIGL SERPL-MCNC: 76 MG/DL
TSH SERPL-ACNC: 3.43 UIU/ML
VIT B12 SERPL-MCNC: 1065 PG/ML
WBC # FLD AUTO: 4.26 K/UL

## 2024-10-29 ENCOUNTER — RX RENEWAL (OUTPATIENT)
Age: 84
End: 2024-10-29

## 2024-11-19 ENCOUNTER — RX RENEWAL (OUTPATIENT)
Age: 84
End: 2024-11-19

## 2025-02-25 ENCOUNTER — APPOINTMENT (OUTPATIENT)
Dept: GERIATRICS | Facility: CLINIC | Age: 85
End: 2025-02-25
Payer: MEDICARE

## 2025-02-25 VITALS
HEIGHT: 63.75 IN | TEMPERATURE: 98 F | OXYGEN SATURATION: 99 % | DIASTOLIC BLOOD PRESSURE: 82 MMHG | BODY MASS INDEX: 21.09 KG/M2 | WEIGHT: 122 LBS | RESPIRATION RATE: 16 BRPM | SYSTOLIC BLOOD PRESSURE: 122 MMHG | HEART RATE: 89 BPM

## 2025-02-25 VITALS
SYSTOLIC BLOOD PRESSURE: 122 MMHG | WEIGHT: 122 LBS | BODY MASS INDEX: 21.1 KG/M2 | TEMPERATURE: 98 F | DIASTOLIC BLOOD PRESSURE: 82 MMHG | OXYGEN SATURATION: 99 % | HEART RATE: 89 BPM

## 2025-02-25 DIAGNOSIS — Z12.5 ENCOUNTER FOR SCREENING FOR MALIGNANT NEOPLASM OF PROSTATE: ICD-10-CM

## 2025-02-25 DIAGNOSIS — E78.49 OTHER HYPERLIPIDEMIA: ICD-10-CM

## 2025-02-25 DIAGNOSIS — I10 ESSENTIAL (PRIMARY) HYPERTENSION: ICD-10-CM

## 2025-02-25 DIAGNOSIS — I63.9 CEREBRAL INFARCTION, UNSPECIFIED: ICD-10-CM

## 2025-02-25 PROCEDURE — G0439: CPT

## 2025-02-25 PROCEDURE — G2211 COMPLEX E/M VISIT ADD ON: CPT

## 2025-02-25 PROCEDURE — 99214 OFFICE O/P EST MOD 30 MIN: CPT | Mod: 25

## 2025-02-25 PROCEDURE — 36415 COLL VENOUS BLD VENIPUNCTURE: CPT

## 2025-02-28 LAB
ALBUMIN SERPL ELPH-MCNC: 4.4 G/DL
ALP BLD-CCNC: 100 U/L
ALT SERPL-CCNC: 26 U/L
ANION GAP SERPL CALC-SCNC: 14 MMOL/L
APPEARANCE: CLEAR
AST SERPL-CCNC: 32 U/L
BASOPHILS # BLD AUTO: 0.04 K/UL
BASOPHILS NFR BLD AUTO: 0.9 %
BILIRUB SERPL-MCNC: 0.6 MG/DL
BILIRUBIN URINE: NEGATIVE
BLOOD URINE: NEGATIVE
BUN SERPL-MCNC: 11 MG/DL
CALCIUM SERPL-MCNC: 9.4 MG/DL
CHLORIDE SERPL-SCNC: 106 MMOL/L
CHOLEST SERPL-MCNC: 186 MG/DL
CO2 SERPL-SCNC: 22 MMOL/L
COLOR: YELLOW
CREAT SERPL-MCNC: 0.65 MG/DL
EGFR: 93 ML/MIN/1.73M2
EOSINOPHIL # BLD AUTO: 0.04 K/UL
EOSINOPHIL NFR BLD AUTO: 0.9 %
ESTIMATED AVERAGE GLUCOSE: 128 MG/DL
GLUCOSE QUALITATIVE U: NEGATIVE MG/DL
GLUCOSE SERPL-MCNC: 110 MG/DL
HBA1C MFR BLD HPLC: 6.1 %
HCT VFR BLD CALC: 39.2 %
HDLC SERPL-MCNC: 72 MG/DL
HGB BLD-MCNC: 12.8 G/DL
IMM GRANULOCYTES NFR BLD AUTO: 0.2 %
KETONES URINE: NEGATIVE MG/DL
LDLC SERPL CALC-MCNC: 99 MG/DL
LEUKOCYTE ESTERASE URINE: NEGATIVE
LYMPHOCYTES # BLD AUTO: 1.49 K/UL
LYMPHOCYTES NFR BLD AUTO: 32.3 %
MAN DIFF?: NORMAL
MCHC RBC-ENTMCNC: 32.7 G/DL
MCHC RBC-ENTMCNC: 32.9 PG
MCV RBC AUTO: 100.8 FL
MONOCYTES # BLD AUTO: 0.26 K/UL
MONOCYTES NFR BLD AUTO: 5.6 %
NEUTROPHILS # BLD AUTO: 2.78 K/UL
NEUTROPHILS NFR BLD AUTO: 60.1 %
NITRITE URINE: NEGATIVE
NONHDLC SERPL-MCNC: 115 MG/DL
PH URINE: 7.5
PLATELET # BLD AUTO: 188 K/UL
POTASSIUM SERPL-SCNC: 4.1 MMOL/L
PROT SERPL-MCNC: 7.1 G/DL
PROTEIN URINE: NEGATIVE MG/DL
PSA SERPL-MCNC: 0.58 NG/ML
RBC # BLD: 3.89 M/UL
RBC # FLD: 12.2 %
SODIUM SERPL-SCNC: 142 MMOL/L
SPECIFIC GRAVITY URINE: 1.01
TRIGL SERPL-MCNC: 84 MG/DL
TSH SERPL-ACNC: 3.88 UIU/ML
UROBILINOGEN URINE: 0.2 MG/DL
VIT B12 SERPL-MCNC: 869 PG/ML
WBC # FLD AUTO: 4.62 K/UL

## 2025-04-10 ENCOUNTER — RX RENEWAL (OUTPATIENT)
Age: 85
End: 2025-04-10

## 2025-09-05 ENCOUNTER — LABORATORY RESULT (OUTPATIENT)
Age: 85
End: 2025-09-05

## 2025-09-05 ENCOUNTER — APPOINTMENT (OUTPATIENT)
Dept: GERIATRICS | Facility: CLINIC | Age: 85
End: 2025-09-05
Payer: MEDICARE

## 2025-09-05 VITALS
WEIGHT: 125 LBS | DIASTOLIC BLOOD PRESSURE: 82 MMHG | HEART RATE: 73 BPM | TEMPERATURE: 97.9 F | SYSTOLIC BLOOD PRESSURE: 132 MMHG | OXYGEN SATURATION: 99 % | BODY MASS INDEX: 21.62 KG/M2

## 2025-09-05 DIAGNOSIS — R73.01 IMPAIRED FASTING GLUCOSE: ICD-10-CM

## 2025-09-05 DIAGNOSIS — N30.20 OTHER CHRONIC CYSTITIS W/OUT HEMATURIA: ICD-10-CM

## 2025-09-05 DIAGNOSIS — I63.9 CEREBRAL INFARCTION, UNSPECIFIED: ICD-10-CM

## 2025-09-05 DIAGNOSIS — E78.49 OTHER HYPERLIPIDEMIA: ICD-10-CM

## 2025-09-05 DIAGNOSIS — D64.9 ANEMIA, UNSPECIFIED: ICD-10-CM

## 2025-09-05 DIAGNOSIS — I10 ESSENTIAL (PRIMARY) HYPERTENSION: ICD-10-CM

## 2025-09-05 LAB
ALBUMIN SERPL ELPH-MCNC: 4.2 G/DL
ALP BLD-CCNC: 90 U/L
ALT SERPL-CCNC: 43 U/L
ANION GAP SERPL CALC-SCNC: 12 MMOL/L
AST SERPL-CCNC: 33 U/L
BASOPHILS # BLD AUTO: 0.04 K/UL
BASOPHILS NFR BLD AUTO: 0.9 %
BILIRUB SERPL-MCNC: 0.6 MG/DL
BUN SERPL-MCNC: 14 MG/DL
CALCIUM SERPL-MCNC: 9.4 MG/DL
CHLORIDE SERPL-SCNC: 105 MMOL/L
CHOLEST SERPL-MCNC: 186 MG/DL
CO2 SERPL-SCNC: 23 MMOL/L
CREAT SERPL-MCNC: 0.62 MG/DL
EGFRCR SERPLBLD CKD-EPI 2021: 94 ML/MIN/1.73M2
EOSINOPHIL # BLD AUTO: 0.09 K/UL
EOSINOPHIL NFR BLD AUTO: 2.1 %
GLUCOSE SERPL-MCNC: 98 MG/DL
HCT VFR BLD CALC: 37.2 %
HDLC SERPL-MCNC: 67 MG/DL
HGB BLD-MCNC: 12.4 G/DL
IMM GRANULOCYTES NFR BLD AUTO: 0.2 %
IRON SATN MFR SERPL: 35 %
IRON SERPL-MCNC: 107 UG/DL
LDLC SERPL-MCNC: 105 MG/DL
LYMPHOCYTES # BLD AUTO: 1.19 K/UL
LYMPHOCYTES NFR BLD AUTO: 27.3 %
MAN DIFF?: NORMAL
MCHC RBC-ENTMCNC: 32.9 PG
MCHC RBC-ENTMCNC: 33.3 G/DL
MCV RBC AUTO: 98.7 FL
MONOCYTES # BLD AUTO: 0.35 K/UL
MONOCYTES NFR BLD AUTO: 8 %
NEUTROPHILS # BLD AUTO: 2.68 K/UL
NEUTROPHILS NFR BLD AUTO: 61.5 %
NONHDLC SERPL-MCNC: 120 MG/DL
PLATELET # BLD AUTO: 187 K/UL
POTASSIUM SERPL-SCNC: 4.2 MMOL/L
PROT SERPL-MCNC: 6.8 G/DL
RBC # BLD: 3.77 M/UL
RBC # FLD: 12.8 %
SODIUM SERPL-SCNC: 140 MMOL/L
TIBC SERPL-MCNC: 301 UG/DL
TRIGL SERPL-MCNC: 81 MG/DL
TSH SERPL-ACNC: 4.95 UIU/ML
UIBC SERPL-MCNC: 195 UG/DL
VIT B12 SERPL-MCNC: 627 PG/ML
WBC # FLD AUTO: 4.36 K/UL

## 2025-09-05 PROCEDURE — G2211 COMPLEX E/M VISIT ADD ON: CPT

## 2025-09-05 PROCEDURE — 36415 COLL VENOUS BLD VENIPUNCTURE: CPT

## 2025-09-05 PROCEDURE — 99214 OFFICE O/P EST MOD 30 MIN: CPT

## 2025-09-06 LAB
ESTIMATED AVERAGE GLUCOSE: 126 MG/DL
HBA1C MFR BLD HPLC: 6 %